# Patient Record
Sex: MALE | Race: BLACK OR AFRICAN AMERICAN | NOT HISPANIC OR LATINO | Employment: STUDENT | URBAN - METROPOLITAN AREA
[De-identification: names, ages, dates, MRNs, and addresses within clinical notes are randomized per-mention and may not be internally consistent; named-entity substitution may affect disease eponyms.]

---
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
## 2018-01-12 ENCOUNTER — OFFICE VISIT (OUTPATIENT)
Dept: PEDIATRICS | Facility: CLINIC | Age: 18
End: 2018-01-12
Payer: MEDICAID

## 2018-01-12 VITALS
SYSTOLIC BLOOD PRESSURE: 117 MMHG | TEMPERATURE: 98 F | DIASTOLIC BLOOD PRESSURE: 70 MMHG | WEIGHT: 124.25 LBS | HEART RATE: 75 BPM | OXYGEN SATURATION: 95 % | HEIGHT: 65 IN | BODY MASS INDEX: 20.7 KG/M2

## 2018-01-12 DIAGNOSIS — R10.13 EPIGASTRIC PAIN: Primary | ICD-10-CM

## 2018-01-12 DIAGNOSIS — R63.0 DECREASED APPETITE: ICD-10-CM

## 2018-01-12 PROCEDURE — 99214 OFFICE O/P EST MOD 30 MIN: CPT | Mod: S$GLB,,, | Performed by: PEDIATRICS

## 2018-01-12 NOTE — LETTER
January 12, 2018                   [facility]  [address]  [place] [address]  Phone: [phone]  Fax: [phone]   January 12, 2018     Patient: Donavan Kim   YOB: 2000   Date of Visit: 1/12/2018       To Whom it May Concern:    Donavan Kim was seen in my clinic on 1/12/2018. He may return to school on 1/15/18.    If you have any questions or concerns, please don't hesitate to call.    Sincerely,         Cynthia Thornton MD

## 2018-01-12 NOTE — PROGRESS NOTES
Subjective:      Patient ID: Donavan Kim is a 17 y.o. male     Chief Complaint: Abdominal Pain (sx. for 2 days.  brought in by mom yasir) and not eating    Abdominal Pain   This is a new problem. Episode onset: 2 days. The pain is located in the epigastric region. Quality: unable to describe quality. The abdominal pain radiates to the chest. Pertinent negatives include no constipation, diarrhea, dysuria, fever, headaches, nausea or vomiting. Exacerbated by: yesterday was worse when going down the stairs at school. He has tried nothing for the symptoms. There is no history of GERD.   There is no family history of GI disorders.    Review of Systems   Constitutional: Positive for appetite change. Negative for fever.   HENT: Negative for congestion and sore throat.    Cardiovascular: Positive for chest pain (associated with abdominal pain).   Gastrointestinal: Positive for abdominal pain (epigastric). Negative for blood in stool, constipation, diarrhea, nausea and vomiting.   Genitourinary: Negative for decreased urine volume, difficulty urinating and dysuria.   Neurological: Negative for headaches.     Objective:   Physical Exam   Constitutional: No distress.   HENT:   Mouth/Throat: Oropharynx is clear and moist.   TMs clear   Neck: Normal range of motion. Neck supple.   Cardiovascular: Normal rate, regular rhythm and normal heart sounds.    Pulmonary/Chest: Effort normal and breath sounds normal.   Abdominal: Soft. Bowel sounds are normal. He exhibits no distension. There is tenderness in the right upper quadrant and left upper quadrant.   Lymphadenopathy:     He has no cervical adenopathy.     Assessment:     1. Epigastric pain    2. Decreased appetite       Plan:   Epigastric pain  -     X-Ray Abdomen AP 1 View; Future; Expected date: 01/12/2018  -     ranitidine (ZANTAC) 150 MG tablet; Take 1 tablet (150 mg total) by mouth 2 (two) times daily.  Dispense: 60 tablet; Refill: 1    Decreased appetite  -      X-Ray Abdomen AP 1 View; Future; Expected date: 01/12/2018  -     ranitidine (ZANTAC) 150 MG tablet; Take 1 tablet (150 mg total) by mouth 2 (two) times daily.  Dispense: 60 tablet; Refill: 1    [place] diet. Handout provided.  F/u KUB results.  Return if symptoms worsen or fail to improve, for Recheck.

## 2018-01-12 NOTE — PATIENT INSTRUCTIONS
[place] Diet   Your child has been prescribed a bland diet (also called a BRAT diet which stands for bananas, rice, applesauce, toast). This diet consists of foods that are soft in texture, mildly seasoned, low in fiber, and easily digested. This diet is for children who have digestive problems. A bland diet reduces irritation of the digestive tract. Have your child eat small frequent meals throughout the day, but stop eating 2 hours before bedtime. Follow any specific instructions from the healthcare provider about foods and beverages your child can and cannot have. The general guidelines below can help get your child started on this diet.    OK to include:  · Water, formula, milk, clear liquids, juices, oral rehydration solutions, broth.  · Cereal, oatmeal, pasta, mashed bananas, applesauce, cooked vegetables, mashed potatoes, rice, and soups with rice or noodles  · Dry toast, crackers, pretzels, bread  Avoid raw fruits and vegetables, beans, spices.  Note: Some children may be sensitive to the lactose in milk or formula. Their symptoms may worsen. If that happens, use oral rehydration solution instead of milk or formula.  Home care  Children should follow the BRAT diet for only a short period of time because it does not provide all the elements of a healthy diet. Following the BRAT diet for too long can cause your child's body to become malnourished. This means he or she is not getting enough of many important nutrients. If your child's body is malnourished, it will be hard for him or her to get better.  Your child should be able to start eating a more regular diet, including fruits and vegetables, within about 24 to 48 hours after vomiting or having diarrhea.  Ask your family doctor if you have any questions about whether your child should follow the BRAT diet.  Date Last Reviewed: 12/21/2015  © 7414-5335 [employer]. [address] [place] [address], [place] [address]. All rights reserved. This information  is not intended as a substitute for professional medical care. Always follow your healthcare professional's instructions.

## 2018-01-13 ENCOUNTER — TELEPHONE (OUTPATIENT)
Dept: PEDIATRICS | Facility: CLINIC | Age: 18
End: 2018-01-13

## 2018-01-13 ENCOUNTER — HOSPITAL ENCOUNTER (OUTPATIENT)
Dept: RADIOLOGY | Facility: HOSPITAL | Age: 18
Discharge: HOME | End: 2018-01-13
Attending: PEDIATRICS
Payer: MEDICAID

## 2018-01-13 DIAGNOSIS — R10.13 EPIGASTRIC PAIN: ICD-10-CM

## 2018-01-13 DIAGNOSIS — R63.0 DECREASED APPETITE: ICD-10-CM

## 2018-01-13 PROCEDURE — 74018 RADEX ABDOMEN 1 VIEW: CPT | Mod: 26,,, | Performed by: RADIOLOGY

## 2018-01-13 PROCEDURE — 74018 RADEX ABDOMEN 1 VIEW: CPT | Mod: TC,PO

## 2018-01-13 NOTE — TELEPHONE ENCOUNTER
----- Message from Renay Brown MD sent at 1/13/2018 11:23 AM CST -----  Please notify the patient's mother that Donavan's xray was normal.  Thank you.

## 2018-04-03 ENCOUNTER — OFFICE VISIT (OUTPATIENT)
Dept: PEDIATRICS | Facility: CLINIC | Age: 18
End: 2018-04-03
Payer: MEDICAID

## 2018-04-03 VITALS
HEIGHT: 66 IN | BODY MASS INDEX: 20 KG/M2 | WEIGHT: 124.44 LBS | SYSTOLIC BLOOD PRESSURE: 124 MMHG | HEART RATE: 82 BPM | DIASTOLIC BLOOD PRESSURE: 72 MMHG

## 2018-04-03 DIAGNOSIS — F90.2 ATTENTION DEFICIT HYPERACTIVITY DISORDER (ADHD), COMBINED TYPE: ICD-10-CM

## 2018-04-03 PROCEDURE — 99214 OFFICE O/P EST MOD 30 MIN: CPT | Mod: S$GLB,,, | Performed by: PEDIATRICS

## 2018-04-03 RX ORDER — DEXTROAMPHETAMINE SACCHARATE, AMPHETAMINE ASPARTATE MONOHYDRATE, DEXTROAMPHETAMINE SULFATE AND AMPHETAMINE SULFATE 3.75; 3.75; 3.75; 3.75 MG/1; MG/1; MG/1; MG/1
15 CAPSULE, EXTENDED RELEASE ORAL DAILY
Qty: 28 CAPSULE | Refills: 0 | Status: SHIPPED | OUTPATIENT
Start: 2018-04-03 | End: 2018-06-19 | Stop reason: SDUPTHER

## 2018-04-03 NOTE — PROGRESS NOTES
Subjective:     History of Present Illness:  Donavan Kim is a 17 y.o. male who presents to the clinic today for medication check (ptin for refill on medication taking adderall 15 mg state's it's wotking ok for him)     History was provided by the patient. Pt was last seen on 1/12/2018.  Donavan here for a med check-taking Adderall XR 15 mg. No c/o side effects and feels that the medication and the dose are working well for him. Sleeping and eating well. No weight loss. In school, studying culinary. About to start work.     Review of Systems   Constitutional: Negative.    HENT: Negative.    Gastrointestinal: Negative.    Neurological: Negative.    Psychiatric/Behavioral: Negative.        Objective:     Physical Exam   Constitutional: He is oriented to person, place, and time. He appears well-developed.   Cardiovascular: Normal rate and regular rhythm.    Pulmonary/Chest: Effort normal.   Neurological: He is alert and oriented to person, place, and time.   Skin: Skin is warm and dry.   Psychiatric: He has a normal mood and affect. His behavior is normal.       Assessment and Plan:     Attention deficit hyperactivity disorder (ADHD), combined type  -     dextroamphetamine-amphetamine (ADDERALL XR) 15 MG 24 hr capsule; Take 1 capsule (15 mg total) by mouth once daily.  Dispense: 28 capsule; Refill: 0        Follow-up in about 6 months (around 10/3/2018), or if symptoms worsen or fail to improve.

## 2018-04-04 ENCOUNTER — DOCUMENTATION ONLY (OUTPATIENT)
Dept: PEDIATRICS | Facility: CLINIC | Age: 18
End: 2018-04-04

## 2018-04-04 NOTE — PROGRESS NOTES
Prior authorization for dextroamp-amphet er 15 mg was approved for 12 months 04/04/18 to 04/04/19.

## 2018-04-13 ENCOUNTER — DOCUMENTATION ONLY (OUTPATIENT)
Dept: PEDIATRICS | Facility: CLINIC | Age: 18
End: 2018-04-13

## 2018-06-19 DIAGNOSIS — F90.2 ATTENTION DEFICIT HYPERACTIVITY DISORDER (ADHD), COMBINED TYPE: ICD-10-CM

## 2018-06-19 RX ORDER — DEXTROAMPHETAMINE SACCHARATE, AMPHETAMINE ASPARTATE MONOHYDRATE, DEXTROAMPHETAMINE SULFATE AND AMPHETAMINE SULFATE 3.75; 3.75; 3.75; 3.75 MG/1; MG/1; MG/1; MG/1
15 CAPSULE, EXTENDED RELEASE ORAL DAILY
Qty: 28 CAPSULE | Refills: 0 | Status: SHIPPED | OUTPATIENT
Start: 2018-06-19 | End: 2018-07-31 | Stop reason: SDUPTHER

## 2018-06-19 NOTE — TELEPHONE ENCOUNTER
----- Message from Joanne Ramirez sent at 6/19/2018  8:36 AM CDT -----  Contact: Keri Ewing   Provider #[id number]    Mother is calling for a Refill on: ADDERALL XR 15mg        [facility]

## 2018-07-31 DIAGNOSIS — F90.2 ATTENTION DEFICIT HYPERACTIVITY DISORDER (ADHD), COMBINED TYPE: ICD-10-CM

## 2018-07-31 RX ORDER — DEXTROAMPHETAMINE SACCHARATE, AMPHETAMINE ASPARTATE MONOHYDRATE, DEXTROAMPHETAMINE SULFATE AND AMPHETAMINE SULFATE 3.75; 3.75; 3.75; 3.75 MG/1; MG/1; MG/1; MG/1
15 CAPSULE, EXTENDED RELEASE ORAL DAILY
Qty: 28 CAPSULE | Refills: 0 | Status: SHIPPED | OUTPATIENT
Start: 2018-07-31 | End: 2018-08-01 | Stop reason: SDUPTHER

## 2018-07-31 NOTE — TELEPHONE ENCOUNTER
----- Message from Magaly Yepez sent at 7/31/2018 11:19 AM CDT -----  Contact: Lindy Kim mom [phone]  Needs rx refill dextroamphetamine-amphetamine (ADDERALL XR) 15 MG 24 hr capsule, [facility] [id number]  [place] - [address] AT [facility], Dr Horn writes the child's rx

## 2018-08-01 RX ORDER — DEXTROAMPHETAMINE SACCHARATE, AMPHETAMINE ASPARTATE MONOHYDRATE, DEXTROAMPHETAMINE SULFATE AND AMPHETAMINE SULFATE 3.75; 3.75; 3.75; 3.75 MG/1; MG/1; MG/1; MG/1
15 CAPSULE, EXTENDED RELEASE ORAL DAILY
Qty: 28 CAPSULE | Refills: 0 | Status: SHIPPED | OUTPATIENT
Start: 2018-08-01 | End: 2018-09-19 | Stop reason: SDUPTHER

## 2018-09-19 DIAGNOSIS — F90.2 ATTENTION DEFICIT HYPERACTIVITY DISORDER (ADHD), COMBINED TYPE: ICD-10-CM

## 2018-09-19 RX ORDER — DEXTROAMPHETAMINE SACCHARATE, AMPHETAMINE ASPARTATE MONOHYDRATE, DEXTROAMPHETAMINE SULFATE AND AMPHETAMINE SULFATE 3.75; 3.75; 3.75; 3.75 MG/1; MG/1; MG/1; MG/1
15 CAPSULE, EXTENDED RELEASE ORAL DAILY
Qty: 28 CAPSULE | Refills: 0 | Status: SHIPPED | OUTPATIENT
Start: 2018-09-19 | End: 2018-11-03 | Stop reason: SDUPTHER

## 2018-09-19 NOTE — TELEPHONE ENCOUNTER
----- Message from Magaly Yepez sent at 9/19/2018  2:04 PM CDT -----  Contact: Lindy Kim mom [phone]  Needs rx refill dextroamphetamine-amphetamine (ADDERALL XR) 15 MG 24 hr capsule, [facility] [id number]  [place] - [address] AT [facility], Dr Horn writes the child's rx

## 2018-10-26 DIAGNOSIS — F90.2 ATTENTION DEFICIT HYPERACTIVITY DISORDER (ADHD), COMBINED TYPE: ICD-10-CM

## 2018-10-26 NOTE — TELEPHONE ENCOUNTER
----- Message from Joanne Ramirez sent at 10/26/2018 10:26 AM CDT -----  Contact: Keri Israel   Provider #23      Mother is calling for a Refill on: ADDERALL XR 15mg        [facility]: [facility]

## 2018-10-29 RX ORDER — DEXTROAMPHETAMINE SACCHARATE, AMPHETAMINE ASPARTATE MONOHYDRATE, DEXTROAMPHETAMINE SULFATE AND AMPHETAMINE SULFATE 3.75; 3.75; 3.75; 3.75 MG/1; MG/1; MG/1; MG/1
15 CAPSULE, EXTENDED RELEASE ORAL DAILY
Qty: 28 CAPSULE | Refills: 0 | OUTPATIENT
Start: 2018-10-29 | End: 2018-11-26

## 2018-11-03 ENCOUNTER — OFFICE VISIT (OUTPATIENT)
Dept: PEDIATRICS | Facility: CLINIC | Age: 18
End: 2018-11-03
Payer: MEDICAID

## 2018-11-03 VITALS
DIASTOLIC BLOOD PRESSURE: 69 MMHG | HEART RATE: 70 BPM | TEMPERATURE: 98 F | SYSTOLIC BLOOD PRESSURE: 131 MMHG | BODY MASS INDEX: 20.37 KG/M2 | WEIGHT: 122.25 LBS | HEIGHT: 65 IN

## 2018-11-03 DIAGNOSIS — F90.2 ATTENTION DEFICIT HYPERACTIVITY DISORDER (ADHD), COMBINED TYPE: ICD-10-CM

## 2018-11-03 PROCEDURE — 99214 OFFICE O/P EST MOD 30 MIN: CPT | Mod: S$GLB,,, | Performed by: PEDIATRICS

## 2018-11-03 RX ORDER — DEXTROAMPHETAMINE SACCHARATE, AMPHETAMINE ASPARTATE MONOHYDRATE, DEXTROAMPHETAMINE SULFATE AND AMPHETAMINE SULFATE 3.75; 3.75; 3.75; 3.75 MG/1; MG/1; MG/1; MG/1
15 CAPSULE, EXTENDED RELEASE ORAL DAILY
Qty: 28 CAPSULE | Refills: 0 | Status: SHIPPED | OUTPATIENT
Start: 2018-11-03 | End: 2019-01-22 | Stop reason: SDUPTHER

## 2018-11-03 NOTE — PROGRESS NOTES
Subjective:     History of Present Illness:  Donavan Kim is a 17 y.o. male who presents to the clinic today for medication check (nabeel and bm- good.  in the 12th grade.  brought in by self)     History was provided by the patient. Pt well known to practice.  Donavan here for a med check-taking Adderall XR 15. Taking on school days only. Pt feels that's lasting throughout the day. No c/o side effects. Sleeping well and eating well. Normal BP and normal weight curve. Senior this year and plans to join  [employer].     Review of Systems   Constitutional: Negative.    Gastrointestinal: Negative.    Neurological: Negative.    Psychiatric/Behavioral: Negative.        Objective:     Physical Exam   Constitutional: He is oriented to person, place, and time. He appears well-developed and well-nourished.   Cardiovascular: Normal rate, regular rhythm and normal heart sounds.   Pulmonary/Chest: Effort normal and breath sounds normal.   Neurological: He is alert and oriented to person, place, and time.       Assessment and Plan:     Attention deficit hyperactivity disorder (ADHD), combined type  -     dextroamphetamine-amphetamine (ADDERALL XR) 15 MG 24 hr capsule; Take 1 capsule (15 mg total) by mouth once daily.  Dispense: 28 capsule; Refill: 0        No Follow-up on file.

## 2019-01-16 ENCOUNTER — OFFICE VISIT (OUTPATIENT)
Dept: PEDIATRICS | Facility: CLINIC | Age: 19
End: 2019-01-16
Payer: MEDICAID

## 2019-01-16 VITALS
DIASTOLIC BLOOD PRESSURE: 73 MMHG | BODY MASS INDEX: 21.86 KG/M2 | HEIGHT: 63 IN | TEMPERATURE: 97 F | SYSTOLIC BLOOD PRESSURE: 123 MMHG | WEIGHT: 123.38 LBS

## 2019-01-16 DIAGNOSIS — Z00.00 WELL ADULT EXAM: Primary | ICD-10-CM

## 2019-01-16 PROCEDURE — 99395 PR PREVENTIVE VISIT,EST,18-39: ICD-10-PCS | Mod: S$GLB,,, | Performed by: PEDIATRICS

## 2019-01-16 PROCEDURE — 99395 PREV VISIT EST AGE 18-39: CPT | Mod: S$GLB,,, | Performed by: PEDIATRICS

## 2019-01-16 NOTE — LETTER
January 16, 2019      [facility]  [address]  [place] [address]  Phone: [phone]  Fax: [phone]       Patient: Donavan Kim   YOB: 2000  Date of Visit: 01/16/2019    To Whom It May Concern:    Mirela Kim  was at [facility] on 01/16/2019. He may return to work/school on 1/17/2019 with no restrictions. If you have any questions or concerns, or if I can be of further assistance, please do not hesitate to contact me.    Sincerely,    Ariel Horn MD

## 2019-01-16 NOTE — PROGRESS NOTES
Subjective:   History was provided by the patient.    Donavan Kim is a 18 y.o. male who is here for this well-child visit.    Current Issues:  Current concerns include none.  Currently menstruating? not applicable  Sexually active? no   Does patient snore? no     Review of Nutrition:  Current diet: regular  Balanced diet? yes    Social Screening:   Parental relations: good  Sibling relations: brothers: 3 and sisters: 3  Discipline concerns? no  Concerns regarding behavior with peers? no  School performance: doing well; no concerns  Secondhand smoke exposure? no  Risk factors for sexually-transmitted infections: no  Risk factors for alcohol/drug use:  none    Review of Systems   Constitutional: Negative.    HENT: Negative.    Eyes: Negative.    Respiratory: Negative.    Cardiovascular: Negative.    Gastrointestinal: Negative.    Genitourinary: Negative.    Musculoskeletal: Negative.    Skin: Negative.    Allergic/Immunologic: Negative.    Neurological: Negative.    Hematological: Negative.    Psychiatric/Behavioral: Negative.          Objective:     Physical Exam   Constitutional: He is oriented to person, place, and time. He appears well-developed and well-nourished.   HENT:   Head: Normocephalic and atraumatic.   Right Ear: External ear normal.   Left Ear: External ear normal.   Nose: Nose normal.   Mouth/Throat: Oropharynx is clear and moist.   Eyes: Conjunctivae and EOM are normal. Pupils are equal, round, and reactive to light.   Neck: Normal range of motion.   Cardiovascular: Normal rate, regular rhythm and normal heart sounds.   Pulmonary/Chest: Effort normal and breath sounds normal.   Abdominal: Soft. Bowel sounds are normal.   Musculoskeletal: Normal range of motion.   Neurological: He is alert and oriented to person, place, and time.   Skin: Skin is warm.   Psychiatric: He has a normal mood and affect. His behavior is normal.       Wt Readings from Last 3 Encounters:   01/16/19 56 kg (123 lb 5.6 oz)  "  11/03/18 55.4 kg (122 lb 3.9 oz) (5 %, Z= -1.67)*   04/03/18 56.4 kg (124 lb 7.2 oz) (7 %, Z= -1.47)*     * Growth percentiles are based on CDC (Boys, 2-20 Years) data.     Ht Readings from Last 3 Encounters:   01/16/19 5' 2.5" (1.588 m)   11/03/18 5' 5" (1.651 m) (5 %, Z= -1.64)*   04/03/18 5' 5.5" (1.664 m) (7 %, Z= -1.44)*     * Growth percentiles are based on CDC (Boys, 2-20 Years) data.     Body mass index is 22.2 kg/m².  Facility age limit for growth percentiles is 20 years.  Facility age limit for growth percentiles is 20 years.    Assessment and Plan     1. Anticipatory guidance discussed.  Gave handout on well-child issues at this age.    2.  Weight management:  The patient was counseled regarding nutrition, physical activity  3. Immunizations today: per orders.    Well adult exam        Follow-up in about 1 year (around 1/16/2020).  "

## 2019-01-22 DIAGNOSIS — F90.2 ATTENTION DEFICIT HYPERACTIVITY DISORDER (ADHD), COMBINED TYPE: ICD-10-CM

## 2019-01-22 RX ORDER — DEXTROAMPHETAMINE SACCHARATE, AMPHETAMINE ASPARTATE MONOHYDRATE, DEXTROAMPHETAMINE SULFATE AND AMPHETAMINE SULFATE 3.75; 3.75; 3.75; 3.75 MG/1; MG/1; MG/1; MG/1
15 CAPSULE, EXTENDED RELEASE ORAL DAILY
Qty: 28 CAPSULE | Refills: 0 | Status: SHIPPED | OUTPATIENT
Start: 2019-01-22 | End: 2019-03-08 | Stop reason: SDUPTHER

## 2019-01-22 NOTE — TELEPHONE ENCOUNTER
----- Message from Eden Roberson sent at 1/22/2019  3:20 PM CST -----  Contact: lolly Davis [phone]  Provider  Dr. Horn    Pt mother calling for    dextroamphetamine-amphetamine (ADDERALL XR) 15 MG 24 hr capsule    Pharmacy   [facility] [id number]  [place] - [address] AT [facility]    Thank you

## 2019-03-08 DIAGNOSIS — F90.2 ATTENTION DEFICIT HYPERACTIVITY DISORDER (ADHD), COMBINED TYPE: ICD-10-CM

## 2019-03-11 RX ORDER — DEXTROAMPHETAMINE SACCHARATE, AMPHETAMINE ASPARTATE MONOHYDRATE, DEXTROAMPHETAMINE SULFATE AND AMPHETAMINE SULFATE 3.75; 3.75; 3.75; 3.75 MG/1; MG/1; MG/1; MG/1
15 CAPSULE, EXTENDED RELEASE ORAL DAILY
Qty: 28 CAPSULE | Refills: 0 | Status: SHIPPED | OUTPATIENT
Start: 2019-03-11 | End: 2019-04-25 | Stop reason: SDUPTHER

## 2019-04-25 DIAGNOSIS — F90.2 ATTENTION DEFICIT HYPERACTIVITY DISORDER (ADHD), COMBINED TYPE: ICD-10-CM

## 2019-04-25 RX ORDER — DEXTROAMPHETAMINE SACCHARATE, AMPHETAMINE ASPARTATE MONOHYDRATE, DEXTROAMPHETAMINE SULFATE AND AMPHETAMINE SULFATE 3.75; 3.75; 3.75; 3.75 MG/1; MG/1; MG/1; MG/1
15 CAPSULE, EXTENDED RELEASE ORAL DAILY
Qty: 28 CAPSULE | Refills: 0 | Status: SHIPPED | OUTPATIENT
Start: 2019-04-25 | End: 2019-05-23

## 2019-04-25 NOTE — TELEPHONE ENCOUNTER
----- Message from Anna Mckee sent at 4/25/2019 11:20 AM CDT -----  Contact: [phone] pt   Type:RX Refill Request  Who Called: [phone] pt   Best Call Back Number:    Preferred Pharmacy:[facility] [address] [place], [place] [address] [address] [place] [address] AT [facility]  Ordering Provider:Kory   RX Name and Strength:dextroamphetamine-amphetamine (ADDERALL XR) 15 MG 24 hr capsule,   How is the patient currently taking it? (ex. 1XDay)  30dayRX  Local or Mail Order:na  Would the patient rather a call back or a response via [facility]?    Additional Information: no

## 2019-04-26 ENCOUNTER — DOCUMENTATION ONLY (OUTPATIENT)
Dept: PEDIATRICS | Facility: CLINIC | Age: 19
End: 2019-04-26

## 2019-04-29 NOTE — PROGRESS NOTES
The prior authorization for adderall xr 15 mg capsules was approved from 04/27/2019 to 04/27/2020.

## 2021-08-07 ENCOUNTER — CLINICAL SUPPORT (OUTPATIENT)
Dept: URGENT CARE | Facility: CLINIC | Age: 21
End: 2021-08-07
Payer: MEDICAID

## 2021-08-07 DIAGNOSIS — Z20.822 CONTACT WITH AND (SUSPECTED) EXPOSURE TO COVID-19: Primary | ICD-10-CM

## 2021-08-07 LAB
CTP QC/QA: YES
SARS-COV-2 RDRP RESP QL NAA+PROBE: NEGATIVE

## 2021-08-07 PROCEDURE — 87635 SARS-COV-2 COVID-19 AMP PRB: CPT | Mod: QW,S$GLB,, | Performed by: PHYSICIAN ASSISTANT

## 2021-08-07 PROCEDURE — 87635: ICD-10-PCS | Mod: QW,S$GLB,, | Performed by: PHYSICIAN ASSISTANT

## 2024-05-27 ENCOUNTER — LAB VISIT (OUTPATIENT)
Dept: LAB | Facility: HOSPITAL | Age: 24
End: 2024-05-27
Payer: MEDICAID

## 2024-05-27 DIAGNOSIS — Z13.1 SCREENING FOR DIABETES MELLITUS: ICD-10-CM

## 2024-05-27 DIAGNOSIS — Z13.29 SCREENING FOR THYROID DISORDER: ICD-10-CM

## 2024-05-27 DIAGNOSIS — E55.9 AVITAMINOSIS D: ICD-10-CM

## 2024-05-27 DIAGNOSIS — R53.81 DEBILITY: ICD-10-CM

## 2024-05-27 DIAGNOSIS — Z13.6 SCREENING FOR ISCHEMIC HEART DISEASE: ICD-10-CM

## 2024-05-27 DIAGNOSIS — E68 SEQUELAE OF HYPERALIMENTATION: ICD-10-CM

## 2024-05-27 DIAGNOSIS — Z00.00 ROUTINE GENERAL MEDICAL EXAMINATION AT A HEALTH CARE FACILITY: Primary | ICD-10-CM

## 2024-05-27 DIAGNOSIS — R73.01 IMPAIRED FASTING GLUCOSE: ICD-10-CM

## 2024-05-27 DIAGNOSIS — Z11.59 SCREENING EXAMINATION FOR POLIOMYELITIS: ICD-10-CM

## 2024-05-27 DIAGNOSIS — Z00.00 ROUTINE GENERAL MEDICAL EXAMINATION AT A HEALTH CARE FACILITY: ICD-10-CM

## 2024-05-27 LAB
ALBUMIN SERPL BCP-MCNC: 4.5 G/DL (ref 3.5–5.2)
ALP SERPL-CCNC: 84 U/L (ref 55–135)
ALT SERPL W/O P-5'-P-CCNC: 14 U/L (ref 10–44)
ANION GAP SERPL CALC-SCNC: 8 MMOL/L (ref 8–16)
AST SERPL-CCNC: 14 U/L (ref 10–40)
BASOPHILS # BLD AUTO: 0.04 K/UL (ref 0–0.2)
BASOPHILS NFR BLD: 0.8 % (ref 0–1.9)
BILIRUB SERPL-MCNC: 0.6 MG/DL (ref 0.1–1)
BUN SERPL-MCNC: 7 MG/DL (ref 6–20)
CALCIUM SERPL-MCNC: 9.5 MG/DL (ref 8.7–10.5)
CHLORIDE SERPL-SCNC: 105 MMOL/L (ref 95–110)
CHOLEST SERPL-MCNC: 170 MG/DL (ref 120–199)
CHOLEST/HDLC SERPL: 3.5 {RATIO} (ref 2–5)
CO2 SERPL-SCNC: 29 MMOL/L (ref 23–29)
CREAT SERPL-MCNC: 1 MG/DL (ref 0.5–1.4)
DIFFERENTIAL METHOD BLD: ABNORMAL
EOSINOPHIL # BLD AUTO: 0.3 K/UL (ref 0–0.5)
EOSINOPHIL NFR BLD: 6.5 % (ref 0–8)
ERYTHROCYTE [DISTWIDTH] IN BLOOD BY AUTOMATED COUNT: 13.9 % (ref 11.5–14.5)
EST. GFR  (NO RACE VARIABLE): >60 ML/MIN/1.73 M^2
ESTIMATED AVG GLUCOSE: 100 MG/DL (ref 68–131)
GLUCOSE SERPL-MCNC: 92 MG/DL (ref 70–110)
HBA1C MFR BLD: 5.1 % (ref 4–5.6)
HCT VFR BLD AUTO: 45.6 % (ref 40–54)
HCV AB SERPL QL IA: NORMAL
HDLC SERPL-MCNC: 49 MG/DL (ref 40–75)
HDLC SERPL: 28.8 % (ref 20–50)
HGB BLD-MCNC: 16.5 G/DL (ref 14–18)
HIV 1+2 AB+HIV1 P24 AG SERPL QL IA: NORMAL
IMM GRANULOCYTES # BLD AUTO: 0.04 K/UL (ref 0–0.04)
IMM GRANULOCYTES NFR BLD AUTO: 0.8 % (ref 0–0.5)
LDLC SERPL CALC-MCNC: 108.4 MG/DL (ref 63–159)
LYMPHOCYTES # BLD AUTO: 2.2 K/UL (ref 1–4.8)
LYMPHOCYTES NFR BLD: 46.9 % (ref 18–48)
MCH RBC QN AUTO: 27.7 PG (ref 27–31)
MCHC RBC AUTO-ENTMCNC: 36.2 G/DL (ref 32–36)
MCV RBC AUTO: 77 FL (ref 82–98)
MONOCYTES # BLD AUTO: 0.4 K/UL (ref 0.3–1)
MONOCYTES NFR BLD: 8.2 % (ref 4–15)
NEUTROPHILS # BLD AUTO: 1.8 K/UL (ref 1.8–7.7)
NEUTROPHILS NFR BLD: 36.8 % (ref 38–73)
NONHDLC SERPL-MCNC: 121 MG/DL
NRBC BLD-RTO: 0 /100 WBC
PLATELET # BLD AUTO: 285 K/UL (ref 150–450)
PMV BLD AUTO: 9.5 FL (ref 9.2–12.9)
POTASSIUM SERPL-SCNC: 3.7 MMOL/L (ref 3.5–5.1)
PROT SERPL-MCNC: 7.7 G/DL (ref 6–8.4)
RBC # BLD AUTO: 5.95 M/UL (ref 4.6–6.2)
SODIUM SERPL-SCNC: 142 MMOL/L (ref 136–145)
TRIGL SERPL-MCNC: 63 MG/DL (ref 30–150)
TSH SERPL DL<=0.005 MIU/L-ACNC: 2.08 UIU/ML (ref 0.4–4)
WBC # BLD AUTO: 4.78 K/UL (ref 3.9–12.7)

## 2024-05-27 PROCEDURE — 80061 LIPID PANEL: CPT | Performed by: FAMILY MEDICINE

## 2024-05-27 PROCEDURE — 85025 COMPLETE CBC W/AUTO DIFF WBC: CPT | Performed by: FAMILY MEDICINE

## 2024-05-27 PROCEDURE — 80053 COMPREHEN METABOLIC PANEL: CPT | Performed by: FAMILY MEDICINE

## 2024-05-27 PROCEDURE — 83036 HEMOGLOBIN GLYCOSYLATED A1C: CPT | Performed by: FAMILY MEDICINE

## 2024-05-27 PROCEDURE — 86803 HEPATITIS C AB TEST: CPT | Performed by: FAMILY MEDICINE

## 2024-05-27 PROCEDURE — 82652 VIT D 1 25-DIHYDROXY: CPT | Performed by: FAMILY MEDICINE

## 2024-05-27 PROCEDURE — 87389 HIV-1 AG W/HIV-1&-2 AB AG IA: CPT | Performed by: FAMILY MEDICINE

## 2024-05-27 PROCEDURE — 84443 ASSAY THYROID STIM HORMONE: CPT | Performed by: FAMILY MEDICINE

## 2024-05-31 LAB — 1,25(OH)2D3 SERPL-MCNC: 68 PG/ML (ref 20–79)

## 2025-01-01 ENCOUNTER — OFFICE VISIT (OUTPATIENT)
Dept: URGENT CARE | Facility: CLINIC | Age: 25
End: 2025-01-01
Payer: MEDICAID

## 2025-01-01 VITALS
WEIGHT: 130.06 LBS | SYSTOLIC BLOOD PRESSURE: 131 MMHG | OXYGEN SATURATION: 98 % | TEMPERATURE: 98 F | DIASTOLIC BLOOD PRESSURE: 76 MMHG | BODY MASS INDEX: 21.67 KG/M2 | RESPIRATION RATE: 16 BRPM | HEART RATE: 76 BPM | HEIGHT: 65 IN

## 2025-01-01 DIAGNOSIS — J06.9 VIRAL URI: Primary | ICD-10-CM

## 2025-01-01 LAB
CTP QC/QA: YES
SARS-COV-2 AG RESP QL IA.RAPID: NEGATIVE

## 2025-01-01 NOTE — PROGRESS NOTES
"Subjective:      Patient ID: Donavan Kim is a 24 y.o. male.    Vitals:  height is 5' 5" (1.651 m) and weight is 59 kg (130 lb 1.1 oz). His oral temperature is 97.9 °F (36.6 °C). His blood pressure is 131/76 and his pulse is 76. His respiration is 16 and oxygen saturation is 98%.     Chief Complaint: Cough    24-year-old male here for coughing sneezing that started about 3 days ago.  He was seen at urgent care and tested positive for COVID.  Symptoms improving. Requesting COVID test. Denies nausea, vomiting, diarrhea, fever, chills, chest pain, SOB.    Cough  This is a new problem. The current episode started in the past 7 days. The problem has been gradually improving. The problem occurs constantly. Pertinent negatives include no chest pain, chills, ear congestion, ear pain, fever, headaches, heartburn, hemoptysis, myalgias, nasal congestion, postnasal drip, rash, rhinorrhea, sore throat, shortness of breath, sweats, weight loss or wheezing. Nothing aggravates the symptoms. He has tried OTC cough suppressant for the symptoms. The treatment provided moderate relief. There is no history of asthma, bronchiectasis, bronchitis, COPD, emphysema, environmental allergies or pneumonia.       Constitution: Negative for chills and fever.   HENT:  Negative for ear pain, postnasal drip and sore throat.    Cardiovascular:  Negative for chest pain.   Respiratory:  Positive for cough. Negative for bloody sputum, shortness of breath and wheezing.    Gastrointestinal:  Negative for heartburn.   Musculoskeletal:  Negative for muscle ache.   Skin:  Negative for rash.   Allergic/Immunologic: Negative for environmental allergies.   Neurological:  Negative for headaches.      Objective:     Physical Exam   Constitutional: He is oriented to person, place, and time. He appears well-developed.   HENT:   Head: Normocephalic and atraumatic.   Ears:   Right Ear: Tympanic membrane, external ear and ear canal normal.   Left Ear: Tympanic " membrane, external ear and ear canal normal.   Nose: Nose normal.   Mouth/Throat: Oropharynx is clear and moist. Mucous membranes are moist. Oropharynx is clear.   Eyes: Conjunctivae, EOM and lids are normal. Pupils are equal, round, and reactive to light.   Neck: Trachea normal and phonation normal. Neck supple.   Cardiovascular: Normal rate, regular rhythm, normal heart sounds and normal pulses.   Pulmonary/Chest: Effort normal and breath sounds normal.   Musculoskeletal: Normal range of motion.         General: Normal range of motion.   Neurological: no focal deficit. He is alert and oriented to person, place, and time.   Skin: Skin is warm, dry and intact. Capillary refill takes less than 2 seconds.   Psychiatric: His speech is normal and behavior is normal. Judgment and thought content normal.   Nursing note and vitals reviewed.    Results for orders placed or performed in visit on 01/01/25   SARS Coronavirus 2 Antigen, POCT Manual Read    Collection Time: 01/01/25  5:56 PM   Result Value Ref Range    SARS Coronavirus 2 Antigen Negative Negative     Acceptable Yes          Assessment:     1. Viral URI        Plan:     Viral URI  -     SARS Coronavirus 2 Antigen, POCT Manual Read                Patient Instructions   - Rest.    - Drink plenty of fluids.  - Viral upper respiratory infections typically run their course in 10-14 days.      - You can take over-the-counter claritin, zyrtec, allegra, or xyzal as directed. These are antihistamines that can help with runny nose, nasal congestion, sneezing, and helps to dry up post-nasal drip, which usually causes sore throat and cough.              - If you do NOT have high blood pressure, you may use a decongestant form (D)  of this medication (ie. Claritin- D, zyrtec-D, allegra-D) or if you do not take the D form, you can take sudafed (pseudoephedrine) over the counter, which is a decongestant. Do NOT take two decongestant (D) medications at the same  time (such as mucinex-D and claritin-D or plain sudafed and claritin D)    - If you DO have Hypertension, anxiety, or palpitations, it is safe to take Coricidin HBP for relief of sinus symptoms.     - You can use Flonase (fluticasone) nasal spray as directed for sinus congestion and postnasal drip. This is a steroid nasal spray that works locally over time to decrease the inflammation in your nose/sinuses and help with allergic symptoms. This is not an quick- relief spray like afrin, but it works well if used daily.  Discontinue if you develop nose bleed  - use nasal saline prior to Flonase.  - Use Ocean Spray Nasal Saline 1-3 puffs each nostril every 2-3 hours then blow out onto tissue. This is to irrigate the nasal passage way to clear the sinus openings. Use until sinus problem resolved.     - you can take plain Mucinex (guaifenesin) 1200 mg twice a day to help loosen mucous.      -warm salt water gargles can help with sore throat     - warm tea with honey can help with cough. Honey is a natural cough suppressant.     - Dextromethorphan (DM) is a cough suppressant over the counter (ie. mucinex DM, robitussin, delsym; dayquil/nyquil has DM as well.)        - Follow up with your PCP or specialty clinic as directed in the next 1-2 weeks if not improved or as needed.  You can call [phone] to schedule an appointment with the appropriate provider.       - Go to the ER if you develop new or worsening symptoms.      - You must understand that you have received an Urgent Care treatment only and that you may be released before all of your medical problems are known or treated.   - You, the patient, will arrange for follow up care as instructed.   - If your condition worsens or fails to improve we recommend that you receive another evaluation at the ER immediately or contact your PCP to discuss your concerns or return here.

## 2025-01-01 NOTE — PATIENT INSTRUCTIONS

## 2025-01-01 NOTE — LETTER
January 1, 2025      [facility]  [address], [address]  [place] [address]  Phone: [phone]  Fax: [phone]       Patient: Donavan Kim   YOB: 2000  Date of Visit: 01/01/2025    To Whom It May Concern:    Mirela Kim  was at [facility] on 01/01/2025. The patient may return to work on 01/02/25. If you have any questions or concerns, or if I can be of further assistance, please do not hesitate to contact me.    Sincerely,    Reuben Mariee PA-C      Joseluis called in to your pharmacy